# Patient Record
Sex: FEMALE | Race: BLACK OR AFRICAN AMERICAN | Employment: UNEMPLOYED | URBAN - METROPOLITAN AREA
[De-identification: names, ages, dates, MRNs, and addresses within clinical notes are randomized per-mention and may not be internally consistent; named-entity substitution may affect disease eponyms.]

---

## 2017-09-11 ENCOUNTER — OFFICE VISIT (OUTPATIENT)
Dept: ENDOCRINOLOGY | Age: 57
End: 2017-09-11

## 2017-09-11 VITALS
RESPIRATION RATE: 14 BRPM | BODY MASS INDEX: 34.55 KG/M2 | TEMPERATURE: 97.6 F | DIASTOLIC BLOOD PRESSURE: 53 MMHG | SYSTOLIC BLOOD PRESSURE: 111 MMHG | HEART RATE: 71 BPM | WEIGHT: 195 LBS | HEIGHT: 63 IN

## 2017-09-11 DIAGNOSIS — E04.2 MULTINODULAR GOITER: Primary | ICD-10-CM

## 2017-09-11 DIAGNOSIS — E03.4 HYPOTHYROIDISM DUE TO ACQUIRED ATROPHY OF THYROID: ICD-10-CM

## 2017-09-11 RX ORDER — LEVOTHYROXINE SODIUM 100 UG/1
TABLET ORAL
COMMUNITY
End: 2018-03-13 | Stop reason: SDUPTHER

## 2017-09-11 NOTE — PATIENT INSTRUCTIONS
Synthroid 100  mcg  A day, on empty stomach with water only, no other meds or food or drinks   For next half hour       Take any kind of vitamins, calcium, iron   Pills  4 hours later

## 2017-09-11 NOTE — MR AVS SNAPSHOT
Visit Information Date & Time Provider Department Dept. Phone Encounter #  
 9/11/2017  2:00 PM Veronica Boles MD [facility] [phone] [id number] Follow-up Instructions Return for usg may be FNA on right for sure and may be left side . Upcoming Health Maintenance Date Due Hepatitis C Screening 1960 DTaP/Tdap/Td series (1 - Tdap) 2/10/1980 PAP AKA CERVICAL CYTOLOGY 2/10/1980 FOBT Q 1 YEAR AGE 50-75 2/10/2009 BREAST CANCER SCRN MAMMOGRAM 6/24/2017 INFLUENZA AGE 9 TO ADULT 8/1/2017 Allergies as of 9/11/2017  Review Complete On: 9/11/2017 By: Veronica Boles MD  
 Not on File Current Immunizations  Never Reviewed No immunizations on file. Not reviewed this visit You Were Diagnosed With   
  
 Codes Comments Multinodular goiter    -  Primary ICD-10-CM: E04.2 ICD-9-CM: 241.1 Hypothyroidism due to acquired atrophy of thyroid     ICD-10-CM: E03.4 ICD-9-CM: 244.8, 246.8 Vitals BP Pulse Temp Resp Height(growth percentile) Weight(growth percentile) 111/53 (BP 1 Location: Left arm, BP Patient Position: Sitting) 71 97.6 °F (36.4 °C) (Oral) 14 5' 3\" (1.6 m) 195 lb (88.5 kg) BMI OB Status Smoking Status 34.54 kg/m2 Hysterectomy Never Smoker BMI and BSA Data Body Mass Index Body Surface Area 34.54 kg/m 2 1.98 m 2 Preferred Pharmacy Pharmacy Name Phone 310 [facility], [facility] 53 91 [address] ([address] [phone] Your Updated Medication List  
  
   
This list is accurate as of: 9/11/17  3:43 PM.  Always use your most recent med list.  
  
  
  
  
 ATACAND HCT 16-12.5 mg per tablet Generic drug:  candesartan-hydroCHLOROthiazide Take 1 Tab by mouth daily. LIPITOR 10 mg tablet Generic drug:  atorvastatin Take  by mouth daily. SYNTHROID 100 mcg tablet Generic drug:  levothyroxine Take  by mouth Daily (before breakfast). We Performed the Following T4, FREE B734142 CPT(R)] THYROGLOBULIN AB [id number] CPT(R)] THYROID PEROXIDASE (TPO) AB [09506 CPT(R)] TSH 3RD GENERATION [83077 CPT(R)] Follow-up Instructions Return for usg may be FNA on right for sure and may be left side . Patient Instructions Synthroid 100  mcg  A day, on empty stomach with water only, no other meds or food or drinks   For next half hour Take any kind of vitamins, calcium, iron   Pills  4 hours later Introducing [facility]! [facility] introduces [employer] patient portal. Now you can access parts of your medical record, email your doctor's office, and request medication refills online. 1. In your internet browser, go to [email or web address] 2. Click on the First Time User? Click Here link in the Sign In box. You will see the New Member Sign Up page. 3. Enter your [employer] Access Code exactly as it appears below. You will not need to use this code after youve completed the sign-up process. If you do not sign up before the expiration date, you must request a new code. · [employer] Access Code: [email or web address] Expires: 12/10/2017  3:43 PM 
 
4. Enter the last four digits of your Social Security Number (xxxx) and Date of Birth (mm/dd/yyyy) as indicated and click Submit. You will be taken to the next sign-up page. 5. Create a [employer] ID. This will be your [employer] login ID and cannot be changed, so think of one that is secure and easy to remember. 6. Create a [employer] password. You can change your password at any time. 7. Enter your Password Reset Question and Answer. This can be used at a later time if you forget your password. 8. Enter your e-mail address. You will receive e-mail notification when new information is available in [address] 9. Click Sign Up. You can now view and download portions of your medical record.  
10. Click the Download Summary menu link to download a portable copy of your medical information. If you have questions, please visit the Frequently Asked Questions section of the [employer] website. Remember, [employer] is NOT to be used for urgent needs. For medical emergencies, dial 911. Now available from your iPhone and Android! Please provide this summary of care documentation to your next provider. Your primary care clinician is listed as Tonie Starkey. If you have any questions after today's visit, please call [phone].

## 2017-09-11 NOTE — LETTER
9/17/2017 8:27 PM 
 
Patient:  Lanie Coburn YOB: 1960 Date of Visit: 9/11/2017 Dear Addie Macario., MD 
[address] [id number] 87 57 64 [address] [facility] [address] VIA Facsimile: [phone] 
 : Thank you for referring Ms. Lanie Coburn to me for evaluation/treatment. Below are the relevant portions of my assessment and plan of care. Chief Complaint Patient presents with  New Patient  Thyroid Problem HISTORY OF PRESENT ILLNESS Lanie Coburn is a 61 y.o. female. HPI Initial visit for  Multinodular  Goiter Referred by pcp She has seen Dr. Jc Hernandez for possible thyroid surgery and she opted not to go for surgery She has no hyper or hypothyroid symptoms She has no compressive symptoms Past Medical History:  
Diagnosis Date  Hypercholesterolemia  Hypertension Social History Social History  Marital status:  Spouse name: N/A  
 Number of children: N/A  
 Years of education: N/A Occupational History  Not on file. Social History Main Topics  Smoking status: Never Smoker  Smokeless tobacco: Never Used  Alcohol use Yes Comment: socially  Drug use: No  
 Sexual activity: Not on file Other Topics Concern  Not on file Social History Narrative Family History Problem Relation Age of Onset  Cancer Father  Breast Cancer Father Review of Systems Constitutional: Negative. HENT: Negative. Eyes: Negative for pain and redness. Respiratory: Negative. Cardiovascular: Negative for chest pain, palpitations and leg swelling. Gastrointestinal: Negative. Negative for constipation. Genitourinary: Negative. Musculoskeletal: Negative for myalgias. Skin: Negative. Neurological: Negative. Endo/Heme/Allergies: Negative. Psychiatric/Behavioral: Negative for depression and memory loss. The patient does not have insomnia.    
 
 
Physical Exam Constitutional: She is oriented to person, place, and time. She appears well-developed and well-nourished. HENT:  
Head: Normocephalic. Eyes: Conjunctivae and EOM are normal. Pupils are equal, round, and reactive to light. Neck: Normal range of motion. Neck supple. No JVD present. No tracheal deviation present. Thyromegaly present. Cardiovascular: Normal rate, regular rhythm and normal heart sounds. No murmur heard. Pulmonary/Chest: Breath sounds normal.  
Abdominal: Soft. Bowel sounds are normal.  
Musculoskeletal: Normal range of motion. Lymphadenopathy:  
  She has no cervical adenopathy. Neurological: She is alert and oriented to person, place, and time. She has normal reflexes. Skin: Skin is warm. Psychiatric: She has a normal mood and affect. ASSESSMENT and PLAN 1. Multinodular goiter : 
Plan for usg  In office For right sided FNA in office and may be on left side if she has a dominant nodule Her mother had gone for some surgery - and never returned alive and she has made a bad connection to surgery Discussed at length pros and cons of surgery and when it is needed She is euthyroid , needs thyrodi labs She is asymptomatic 2. Hypothyroidism ; on 100 mcg  Synthroid a day Ordered labs  
 
> 50 % of time is spent on counseling Patient voiced understanding her plan of care If you have questions, please do not hesitate to call me. I look forward to following Ms. Arley Lakhani along with you. Sincerely, Alfred Colby MD

## 2017-09-11 NOTE — PROGRESS NOTES
HISTORY OF PRESENT ILLNESS  Art Pickard is a 61 y.o. female. HPI  Initial visit for  Multinodular  Goiter   Referred by pcp    She has seen Dr. Rock Maria for possible thyroid surgery and she opted not to go for surgery     She has no hyper or hypothyroid symptoms   She has no compressive symptoms         Past Medical History:   Diagnosis Date    Hypercholesterolemia     Hypertension        Social History     Social History    Marital status:      Spouse name: N/A    Number of children: N/A    Years of education: N/A     Occupational History    Not on file. Social History Main Topics    Smoking status: Never Smoker    Smokeless tobacco: Never Used    Alcohol use Yes      Comment: socially    Drug use: No    Sexual activity: Not on file     Other Topics Concern    Not on file     Social History Narrative       Family History   Problem Relation Age of Onset    Cancer Father     Breast Cancer Father          Review of Systems   Constitutional: Negative. HENT: Negative. Eyes: Negative for pain and redness. Respiratory: Negative. Cardiovascular: Negative for chest pain, palpitations and leg swelling. Gastrointestinal: Negative. Negative for constipation. Genitourinary: Negative. Musculoskeletal: Negative for myalgias. Skin: Negative. Neurological: Negative. Endo/Heme/Allergies: Negative. Psychiatric/Behavioral: Negative for depression and memory loss. The patient does not have insomnia. Physical Exam   Constitutional: She is oriented to person, place, and time. She appears well-developed and well-nourished. HENT:   Head: Normocephalic. Eyes: Conjunctivae and EOM are normal. Pupils are equal, round, and reactive to light. Neck: Normal range of motion. Neck supple. No JVD present. No tracheal deviation present. Thyromegaly present. Cardiovascular: Normal rate, regular rhythm and normal heart sounds. No murmur heard.   Pulmonary/Chest: Breath sounds normal.   Abdominal: Soft. Bowel sounds are normal.   Musculoskeletal: Normal range of motion. Lymphadenopathy:     She has no cervical adenopathy. Neurological: She is alert and oriented to person, place, and time. She has normal reflexes. Skin: Skin is warm. Psychiatric: She has a normal mood and affect. ASSESSMENT and PLAN    1. Multinodular goiter :  Plan for usg  In office   For right sided FNA in office and may be on left side if she has a dominant nodule   Her mother had gone for some surgery - and never returned alive and she has made a bad connection to surgery   Discussed at length pros and cons of surgery and when it is needed   She is euthyroid , needs thyrodi labs   She is asymptomatic       2.  Hypothyroidism ; on 100 mcg  Synthroid a day   Ordered labs       > 50 % of time is spent on counseling   Patient voiced understanding her plan of care

## 2017-09-12 LAB
T4 FREE SERPL-MCNC: 1.78 NG/DL (ref 0.82–1.77)
THYROGLOB AB SERPL-ACNC: <1 IU/ML (ref 0–0.9)
THYROPEROXIDASE AB SERPL-ACNC: 12 IU/ML (ref 0–34)
TSH SERPL DL<=0.005 MIU/L-ACNC: 0.18 UIU/ML (ref 0.45–4.5)

## 2017-09-13 ENCOUNTER — OFFICE VISIT (OUTPATIENT)
Dept: ENDOCRINOLOGY | Age: 57
End: 2017-09-13

## 2017-09-13 ENCOUNTER — HOSPITAL ENCOUNTER (OUTPATIENT)
Dept: LAB | Age: 57
Discharge: HOME | End: 2017-09-13

## 2017-09-13 VITALS
RESPIRATION RATE: 14 BRPM | SYSTOLIC BLOOD PRESSURE: 120 MMHG | DIASTOLIC BLOOD PRESSURE: 67 MMHG | HEART RATE: 70 BPM | HEIGHT: 63 IN | TEMPERATURE: 97 F | BODY MASS INDEX: 34.55 KG/M2 | WEIGHT: 195 LBS

## 2017-09-13 DIAGNOSIS — E04.2 MULTIPLE THYROID NODULES: ICD-10-CM

## 2017-09-13 DIAGNOSIS — E04.2 MULTINODULAR GOITER: Primary | ICD-10-CM

## 2017-09-13 NOTE — PROGRESS NOTES
Wt Readings from Last 3 Encounters:   09/13/17 195 lb (88.5 kg)   09/11/17 195 lb (88.5 kg)   06/24/15 212 lb (96.2 kg)     Temp Readings from Last 3 Encounters:   09/13/17 97 °F (36.1 °C) (Oral)   09/11/17 97.6 °F (36.4 °C) (Oral)     BP Readings from Last 3 Encounters:   09/13/17 120/67   09/11/17 111/53   06/24/15 123/71     Pulse Readings from Last 3 Encounters:   09/13/17 70   09/11/17 71   06/24/15 98

## 2017-09-13 NOTE — MR AVS SNAPSHOT
Visit Information Date & Time Provider Department Dept. Phone Encounter #  
 9/13/2017  3:00 PM Claudene Maffucci, MD [facility] [phone] [id number] Follow-up Instructions Return in about 6 months (around 3/13/2018). Upcoming Health Maintenance Date Due Hepatitis C Screening 1960 DTaP/Tdap/Td series (1 - Tdap) 2/10/1980 PAP AKA CERVICAL CYTOLOGY 2/10/1980 FOBT Q 1 YEAR AGE 50-75 2/10/2009 BREAST CANCER SCRN MAMMOGRAM 6/24/2017 INFLUENZA AGE 9 TO ADULT 8/1/2017 Allergies as of 9/13/2017  Review Complete On: 9/13/2017 By: Claudene Maffucci, MD  
 No Known Allergies Current Immunizations  Never Reviewed No immunizations on file. Not reviewed this visit Vitals BP Pulse Temp Resp Height(growth percentile) Weight(growth percentile) 120/67 (BP 1 Location: Left arm, BP Patient Position: Sitting) 70 97 °F (36.1 °C) (Oral) 14 5' 3\" (1.6 m) 195 lb (88.5 kg) BMI OB Status Smoking Status 34.54 kg/m2 Hysterectomy Never Smoker BMI and BSA Data Body Mass Index Body Surface Area 34.54 kg/m 2 1.98 m 2 Preferred Pharmacy Pharmacy Name Phone 310 [facility], [facility] 53 91 [address] ([address] [phone] Your Updated Medication List  
  
   
This list is accurate as of: 9/13/17  4:42 PM.  Always use your most recent med list.  
  
  
  
  
 ATACAND HCT 16-12.5 mg per tablet Generic drug:  candesartan-hydroCHLOROthiazide Take 1 Tab by mouth daily. LIPITOR 10 mg tablet Generic drug:  atorvastatin Take  by mouth daily. SYNTHROID 100 mcg tablet Generic drug:  levothyroxine Take  by mouth Daily (before breakfast). Follow-up Instructions Return in about 6 months (around 3/13/2018). Patient Instructions Please take tylenol 500 mg or Motrin 400 mg (2 pills of 200 mg dose) OTC,  if there is any biopsy site pain You can go back to your normal routine immediately If you notice any dime sized redness, at the biopsy site, it is normal and do not worry If you have more symptoms and signs requiring emergency assistance,  call 911   or go to Emergency room Introducing [facility]! Desmond García introduces [employer] patient portal. Now you can access parts of your medical record, email your doctor's office, and request medication refills online. 1. In your internet browser, go to [email or web address] 2. Click on the First Time User? Click Here link in the Sign In box. You will see the New Member Sign Up page. 3. Enter your [employer] Access Code exactly as it appears below. You will not need to use this code after youve completed the sign-up process. If you do not sign up before the expiration date, you must request a new code. · [employer] Access Code: [email or web address] Expires: 12/10/2017  3:43 PM 
 
4. Enter the last four digits of your Social Security Number (xxxx) and Date of Birth (mm/dd/yyyy) as indicated and click Submit. You will be taken to the next sign-up page. 5. Create a [employer] ID. This will be your [employer] login ID and cannot be changed, so think of one that is secure and easy to remember. 6. Create a [employer] password. You can change your password at any time. 7. Enter your Password Reset Question and Answer. This can be used at a later time if you forget your password. 8. Enter your e-mail address. You will receive e-mail notification when new information is available in [address] 9. Click Sign Up. You can now view and download portions of your medical record. 10. Click the Download Summary menu link to download a portable copy of your medical information. If you have questions, please visit the Frequently Asked Questions section of the [employer] website. Remember, [employer] is NOT to be used for urgent needs. For medical emergencies, dial 911. Now available from your iPhone and Android! Please provide this summary of care documentation to your next provider. Your primary care clinician is listed as Tonie Starkey. If you have any questions after today's visit, please call [phone].

## 2017-09-13 NOTE — PROGRESS NOTES
[facility] DIABETES & ENDOCRINOLOGY  OFFICE PROCEDURE PROGRESS NOTE        Chart reviewed for the following:   Erin Schneider MD, have reviewed the History, Physical and updated the Allergic reactions for [address] performed immediately prior to start of procedure:   Erin Schneider MD, have performed the following reviews on Andrea Gordillo prior to the start of the procedure:            * Patient was identified by name and date of birth   * Agreement on procedure being performed was verified  * Risks and Benefits explained to the patient  * Procedure site verified and marked as necessary  * Patient was positioned for comfort  * Consent was signed and verified       Time: 4  PM    Pain scale :  0    Date of procedure: 9/13/2017    Procedure performed by:  Michael Jensen MD    Provider assisted by:     Norrine Gowers LPN    Patient assisted by: her        Real time imaging was performed in both transverse and sagittal planes    Nodule  : left   Following informed consent, a procedural pause was held to confirm patiient identity and site of biopsy. After sterile preparation, FNA guidance was performed using direct ultrasound guidance to confirm accurate needle placement. 3 aspirations were made using 25 G needles  Samples were submitted to cytology  Patient  tolerated procedure well without complications  After care instructions provided. Pain scale post procedure :  0          Real time imaging was performed in both transverse and sagittal planes    Nodule Right   Following informed consent, a procedural pause was held to confirm patiient identity and site of biopsy. After sterile preparation, FNA guidance was performed using direct ultrasound guidance to confirm accurate needle placement. 6 aspirations were made using 25 G needles  Samples were submitted to cytology  Patient  tolerated procedure well without complications  After care instructions provided.       Pain scale post procedure :   0

## 2018-03-13 ENCOUNTER — OFFICE VISIT (OUTPATIENT)
Dept: ENDOCRINOLOGY | Age: 58
End: 2018-03-13

## 2018-03-13 VITALS
HEART RATE: 52 BPM | HEIGHT: 63 IN | OXYGEN SATURATION: 99 % | RESPIRATION RATE: 18 BRPM | TEMPERATURE: 97.3 F | DIASTOLIC BLOOD PRESSURE: 68 MMHG | SYSTOLIC BLOOD PRESSURE: 128 MMHG

## 2018-03-13 DIAGNOSIS — E04.2 MULTINODULAR GOITER: ICD-10-CM

## 2018-03-13 DIAGNOSIS — E03.9 ACQUIRED HYPOTHYROIDISM: Primary | ICD-10-CM

## 2018-03-13 DIAGNOSIS — E03.4 HYPOTHYROIDISM DUE TO ACQUIRED ATROPHY OF THYROID: Primary | ICD-10-CM

## 2018-03-13 RX ORDER — LEVOTHYROXINE SODIUM 100 UG/1
100 TABLET ORAL
Qty: 90 TAB | Refills: 4 | Status: SHIPPED | OUTPATIENT
Start: 2018-03-13

## 2018-03-13 NOTE — PROGRESS NOTES
HISTORY OF PRESENT ILLNESS  Hayden Hua is a 60 y.o. female. HPI  First f/u after initial visit for  Multinodular  Goiter from sept 2017     Had FNA of left side nodule - negative for cancer from  Sept 2017     She is asymptomatic   She has no hyper or hypothyroid symptoms             Old history :    Referred by pcp    She has seen Dr. Harrison Paulino for possible thyroid surgery and she opted not to go for surgery     She has no hyper or hypothyroid symptoms   She has no compressive symptoms         Past Medical History:   Diagnosis Date    Hypercholesterolemia     Hypertension        Social History     Social History    Marital status:      Spouse name: N/A    Number of children: N/A    Years of education: N/A     Occupational History    Not on file. Social History Main Topics    Smoking status: Never Smoker    Smokeless tobacco: Never Used    Alcohol use Yes      Comment: socially    Drug use: No    Sexual activity: Not on file     Other Topics Concern    Not on file     Social History Narrative       Family History   Problem Relation Age of Onset    Cancer Father     Breast Cancer Father          Review of Systems   Constitutional: Negative. HENT: Negative. Eyes: Negative for pain and redness. Respiratory: Negative. Cardiovascular: Negative for chest pain, palpitations and leg swelling. Gastrointestinal: Negative. Negative for constipation. Genitourinary: Negative. Musculoskeletal: Negative for myalgias. Skin: Negative. Neurological: Negative. Endo/Heme/Allergies: Negative. Psychiatric/Behavioral: Negative for depression and memory loss. The patient does not have insomnia. Physical Exam   Constitutional: She is oriented to person, place, and time. She appears well-developed and well-nourished. HENT:   Head: Normocephalic. Eyes: Conjunctivae and EOM are normal. Pupils are equal, round, and reactive to light. Neck: Normal range of motion.  Neck supple. No JVD present. No tracheal deviation present. Thyromegaly present. Cardiovascular: Normal rate, regular rhythm and normal heart sounds. No murmur heard. Pulmonary/Chest: Breath sounds normal.   Abdominal: Soft. Bowel sounds are normal.   Musculoskeletal: Normal range of motion. Lymphadenopathy:     She has no cervical adenopathy. Neurological: She is alert and oriented to person, place, and time. She has normal reflexes. Skin: Skin is warm. Psychiatric: She has a normal mood and affect. Lab Results  Component Value Date/Time   TSH 0.214 (L) 03/06/2018 08:57 AM   T4, Free 2.00 (H) 03/06/2018 08:57 AM          ASSESSMENT and PLAN    1. Multinodular goiter :  S/p  right sided FNA in office and it is negative for cancer   Her mother had gone for some surgery - and never returned alive and she has made a bad connection to surgery   She had Dr. Quillian Castleman  Seen but refused to undergo. Discussed at length pros and cons of surgery and when it is needed   She is euthyroid , needs thyrodi labs   She is asymptomatic       2. Hypothyroidism ; on 100 mcg  Synthroid a day   Euthyroid       3. There is no height or weight on file to calculate BMI.   BMI  34   She is hypertensive and hyperlipidemic ( on meds )  Weight loss brochures given      > 50 % of time is spent on counseling   Patient voiced understanding her plan of care

## 2018-03-13 NOTE — MR AVS SNAPSHOT
[address] [facility], [facility] [address] 
[phone] Patient: Melissa Amaro MRN: [id number]:[id number] Visit Information Date & Time Provider Department Dept. Phone Encounter #  
 3/13/2018  9:30 AM Stanislaw Martínez MD [facility] [phone] [id number] Follow-up Instructions Return in about 3 months (around 6/13/2018). Upcoming Health Maintenance Date Due Hepatitis C Screening 1960 DTaP/Tdap/Td series (1 - Tdap) 2/10/1980 PAP AKA CERVICAL CYTOLOGY 2/10/1980 FOBT Q 1 YEAR AGE 50-75 2/10/2009 BREAST CANCER SCRN MAMMOGRAM 6/24/2017 Influenza Age 5 to Adult 8/1/2017 Allergies as of 3/13/2018  Review Complete On: 3/13/2018 By: Stanislaw Martínez MD  
 No Known Allergies Current Immunizations  Never Reviewed No immunizations on file. Not reviewed this visit Vitals BP Pulse Temp Resp Height(growth percentile) SpO2  
 128/68 (BP 1 Location: Left arm, BP Patient Position: Sitting) (!) 52 97.3 °F (36.3 °C) (Oral) 18 5' 3\" (1.6 m) 99% OB Status Smoking Status Hysterectomy Never Smoker Vitals History Preferred Pharmacy Pharmacy Name Phone [facility] [address] [phone] Your Updated Medication List  
  
   
This list is accurate as of 3/13/18  9:49 AM.  Always use your most recent med list.  
  
  
  
  
 ATACAND HCT 16-12.5 mg per tablet Generic drug:  candesartan-hydroCHLOROthiazide Take 1 Tab by mouth daily. LIPITOR 10 mg tablet Generic drug:  atorvastatin Take  by mouth daily. SYNTHROID 100 mcg tablet Generic drug:  levothyroxine Take  by mouth Daily (before breakfast). Follow-up Instructions Return in about 3 months (around 6/13/2018). Patient Instructions Synthroid 100 mcg  A day, on empty stomach with water only, no other meds or food or drinks   For next half hour Take any kind of vitamins, calcium, iron   Pills  4 hours later 
 
-------------------------------------------------------------------------------------------------------------------------------------------------- 
 
 
BELVIQ ( can be tried one pill a day ) OR  
 
CONTRAVE ( cheaper 70, 4 pills a day ) 
 
OR  
 
Q-symia ( one pill a day ) Introducing [facility]! Lidia Mejia introduces [employer] patient portal. Now you can access parts of your medical record, email your doctor's office, and request medication refills online. 1. In your internet browser, go to [email or web address] 2. Click on the First Time User? Click Here link in the Sign In box. You will see the New Member Sign Up page. 3. Enter your [employer] Access Code exactly as it appears below. You will not need to use this code after youve completed the sign-up process. If you do not sign up before the expiration date, you must request a new code. · [employer] Access Code: [email or web address] Expires: 6/11/2018  9:49 AM 
 
4. Enter the last four digits of your Social Security Number (xxxx) and Date of Birth (mm/dd/yyyy) as indicated and click Submit. You will be taken to the next sign-up page. 5. Create a [employer] ID. This will be your [employer] login ID and cannot be changed, so think of one that is secure and easy to remember. 6. Create a [employer] password. You can change your password at any time. 7. Enter your Password Reset Question and Answer. This can be used at a later time if you forget your password. 8. Enter your e-mail address. You will receive e-mail notification when new information is available in [address]. 9. Click Sign Up. You can now view and download portions of your medical record. 10. Click the Download Summary menu link to download a portable copy of your medical information.  
 
If you have questions, please visit the Frequently Asked Questions section of the [employer] website. Remember, [employer] is NOT to be used for urgent needs. For medical emergencies, dial 911. Now available from your iPhone and Android! Please provide this summary of care documentation to your next provider. Your primary care clinician is listed as Anabel Gonzalez. If you have any questions after today's visit, please call [phone].

## 2018-03-13 NOTE — PROGRESS NOTES
1. Have you been to the ER, urgent care clinic since your last visit? Hospitalized since your last visit? No    2. Have you seen or consulted any other health care providers outside of the [facility] since your last visit? Include any pap smears or colon screening. No     Chief Complaint   Patient presents with    Thyroid Problem     followup     .

## 2018-03-13 NOTE — PATIENT INSTRUCTIONS
Synthroid 100 mcg  A day, on empty stomach with water only, no other meds or food or drinks   For next half hour     Take any kind of vitamins, calcium, iron   Pills  4 hours later    --------------------------------------------------------------------------------------------------------------------------------------------------      BELVIQ ( can be tried one pill a day )    OR     CONTRAVE ( cheaper 70, 4 pills a day )    OR     Q-symia ( one pill a day )

## 2018-03-17 PROBLEM — E04.2 MULTINODULAR GOITER: Status: ACTIVE | Noted: 2018-03-17

## 2018-03-17 PROBLEM — E03.4 HYPOTHYROIDISM DUE TO ACQUIRED ATROPHY OF THYROID: Status: ACTIVE | Noted: 2018-03-17

## 2018-06-01 ENCOUNTER — TELEPHONE (OUTPATIENT)
Dept: ENDOCRINOLOGY | Age: 58
End: 2018-06-01

## 2018-06-01 DIAGNOSIS — E03.4 HYPOTHYROIDISM DUE TO ACQUIRED ATROPHY OF THYROID: Primary | ICD-10-CM

## 2019-08-01 ENCOUNTER — OP HISTORICAL/CONVERTED ENCOUNTER (OUTPATIENT)
Dept: OTHER | Age: 59
End: 2019-08-01

## 2022-03-19 PROBLEM — E03.4 HYPOTHYROIDISM DUE TO ACQUIRED ATROPHY OF THYROID: Status: ACTIVE | Noted: 2018-03-17

## 2022-03-19 PROBLEM — E04.2 MULTINODULAR GOITER: Status: ACTIVE | Noted: 2018-03-17

## 2022-08-05 ENCOUNTER — TRANSCRIBE ORDER (OUTPATIENT)
Dept: SCHEDULING | Age: 62
End: 2022-08-05

## 2022-08-05 DIAGNOSIS — R06.02 SHORTNESS OF BREATH: ICD-10-CM

## 2022-08-05 DIAGNOSIS — R07.9 CHEST PAIN: Primary | ICD-10-CM

## 2022-08-19 ENCOUNTER — HOSPITAL ENCOUNTER (OUTPATIENT)
Dept: NUCLEAR MEDICINE | Age: 62
Discharge: HOME | End: 2022-08-19
Attending: INTERNAL MEDICINE
Payer: OTHER GOVERNMENT

## 2022-08-19 ENCOUNTER — HOSPITAL ENCOUNTER (OUTPATIENT)
Dept: NON INVASIVE DIAGNOSTICS | Age: 62
Discharge: HOME | End: 2022-08-19
Attending: INTERNAL MEDICINE
Payer: OTHER GOVERNMENT

## 2022-08-19 VITALS
HEIGHT: 63 IN | BODY MASS INDEX: 34.55 KG/M2 | SYSTOLIC BLOOD PRESSURE: 141 MMHG | WEIGHT: 195 LBS | DIASTOLIC BLOOD PRESSURE: 85 MMHG

## 2022-08-19 DIAGNOSIS — R07.9 CHEST PAIN: ICD-10-CM

## 2022-08-19 DIAGNOSIS — R06.02 SHORTNESS OF BREATH: ICD-10-CM

## 2022-08-19 LAB
NUC STRESS EJECTION FRACTION: 84 %
STRESS BASELINE DIAS BP: 85 MMHG
STRESS BASELINE HR: 75 BPM
STRESS BASELINE ST DEPRESSION: 0 MM
STRESS BASELINE SYS BP: 141 MMHG
STRESS PERCENT HR ACHIEVED: 85 %
STRESS POST PEAK HR: 133 BPM
STRESS STAGE 1 BP: NORMAL MMHG
STRESS STAGE 1 DURATION: NORMAL MIN:SEC
STRESS STAGE 1 HR: 93 BPM
STRESS STAGE 2 DURATION: NORMAL MIN:SEC
STRESS STAGE 2 HR: 91 BPM
STRESS STAGE 3 BP: NORMAL MMHG
STRESS STAGE 3 DURATION: NORMAL MIN:SEC
STRESS STAGE 3 HR: 88 BPM
STRESS STAGE 4 DURATION: NORMAL MIN:SEC
STRESS STAGE 4 HR: 87 BPM
STRESS STAGE 5 BP: NORMAL MMHG
STRESS STAGE 5 DURATION: NORMAL MIN:SEC
STRESS STAGE 5 HR: 85 BPM
STRESS TARGET HR: 157 BPM

## 2022-08-19 PROCEDURE — 74011250636 HC RX REV CODE- 250/636

## 2022-08-19 PROCEDURE — 93017 CV STRESS TEST TRACING ONLY: CPT

## 2022-08-19 RX ORDER — TETRAKIS(2-METHOXYISOBUTYLISOCYANIDE)COPPER(I) TETRAFLUOROBORATE 1 MG/ML
10.8 INJECTION, POWDER, LYOPHILIZED, FOR SOLUTION INTRAVENOUS
Status: COMPLETED | OUTPATIENT
Start: 2022-08-19 | End: 2022-08-19

## 2022-08-19 RX ADMIN — REGADENOSON 0.4 MG: 0.08 INJECTION, SOLUTION INTRAVENOUS at 09:19

## 2022-08-19 RX ADMIN — TETRAKIS(2-METHOXYISOBUTYLISOCYANIDE)COPPER(I) TETRAFLUOROBORATE 10.8 MILLICURIE: 1 INJECTION, POWDER, LYOPHILIZED, FOR SOLUTION INTRAVENOUS at 08:30

## 2023-05-25 RX ORDER — LEVOTHYROXINE SODIUM 0.1 MG/1
100 TABLET ORAL
COMMUNITY
Start: 2018-03-13

## 2023-05-25 RX ORDER — ATORVASTATIN CALCIUM 10 MG/1
TABLET, FILM COATED ORAL DAILY
COMMUNITY

## 2023-05-25 RX ORDER — CANDESARTAN CILEXETIL AND HYDROCHLOROTHIAZIDE 16; 12.5 MG/1; MG/1
1 TABLET ORAL DAILY
COMMUNITY

## 2024-05-10 ENCOUNTER — HOSPITAL ENCOUNTER (OUTPATIENT)
Facility: HOSPITAL | Age: 64
End: 2024-05-10
Payer: MEDICARE

## 2024-05-10 DIAGNOSIS — M51.26 OTHER INTERVERTEBRAL DISC DISPLACEMENT, LUMBAR REGION: ICD-10-CM

## 2024-05-10 PROCEDURE — 72148 MRI LUMBAR SPINE W/O DYE: CPT

## 2024-06-20 ENCOUNTER — HOSPITAL ENCOUNTER (OUTPATIENT)
Facility: HOSPITAL | Age: 64
Discharge: HOME | End: 2024-06-20
Payer: MEDICARE

## 2024-06-20 DIAGNOSIS — N95.8 POSTARTIFICIAL MENOPAUSAL SYNDROME: ICD-10-CM

## 2024-06-20 PROCEDURE — 77080 DXA BONE DENSITY AXIAL: CPT
